# Patient Record
Sex: FEMALE | Race: WHITE | NOT HISPANIC OR LATINO
[De-identification: names, ages, dates, MRNs, and addresses within clinical notes are randomized per-mention and may not be internally consistent; named-entity substitution may affect disease eponyms.]

---

## 2019-06-14 PROBLEM — Z00.00 ENCOUNTER FOR PREVENTIVE HEALTH EXAMINATION: Status: ACTIVE | Noted: 2019-06-14

## 2019-06-26 ENCOUNTER — APPOINTMENT (OUTPATIENT)
Dept: ORTHOPEDIC SURGERY | Facility: CLINIC | Age: 84
End: 2019-06-26
Payer: MEDICARE

## 2019-06-26 VITALS — WEIGHT: 140 LBS | BODY MASS INDEX: 31.49 KG/M2 | HEIGHT: 56 IN

## 2019-06-26 DIAGNOSIS — Z82.61 FAMILY HISTORY OF ARTHRITIS: ICD-10-CM

## 2019-06-26 DIAGNOSIS — Z78.9 OTHER SPECIFIED HEALTH STATUS: ICD-10-CM

## 2019-06-26 PROCEDURE — 20610 DRAIN/INJ JOINT/BURSA W/O US: CPT | Mod: 59,RT

## 2019-06-26 PROCEDURE — 99203 OFFICE O/P NEW LOW 30 MIN: CPT | Mod: 25

## 2019-06-26 PROCEDURE — 73564 X-RAY EXAM KNEE 4 OR MORE: CPT | Mod: RT

## 2019-06-26 RX ORDER — ATENOLOL 50 MG/1
TABLET ORAL
Refills: 0 | Status: ACTIVE | COMMUNITY

## 2019-06-26 RX ORDER — PREGABALIN 300 MG/1
CAPSULE ORAL
Refills: 0 | Status: ACTIVE | COMMUNITY

## 2019-06-26 RX ORDER — ASPIRIN 325 MG/1
TABLET, FILM COATED ORAL
Refills: 0 | Status: ACTIVE | COMMUNITY

## 2019-06-26 RX ORDER — SERTRALINE 25 MG/1
TABLET, FILM COATED ORAL
Refills: 0 | Status: ACTIVE | COMMUNITY

## 2019-10-02 ENCOUNTER — APPOINTMENT (OUTPATIENT)
Dept: ORTHOPEDIC SURGERY | Facility: CLINIC | Age: 84
End: 2019-10-02
Payer: MEDICARE

## 2019-10-02 DIAGNOSIS — M25.561 PAIN IN RIGHT KNEE: ICD-10-CM

## 2019-10-02 DIAGNOSIS — M25.562 PAIN IN RIGHT KNEE: ICD-10-CM

## 2019-10-02 PROCEDURE — 99213 OFFICE O/P EST LOW 20 MIN: CPT

## 2019-10-02 NOTE — DISCUSSION/SUMMARY
[de-identified] : Discussed at length the nature of the patients condition. Their bilateral knee symptoms appear secondary to degenerative arthritis. We reviewed operative and nonoperative treatment. She is not a surgical candidate due to her age, nor does she want to consider surgery. Therefore, we will continue nonoperatively. We will seek authorization for bilateral knee Euflexxa injections. Once we receive authorization, we will proceed accordingly. In the interim, patient was given a bilateral knee cortisone injections today as detailed above for symptomatic relief. I also suggested PT and continuation of Tylenol 1000 BID. They can continue activities as tolerated.\par

## 2019-10-02 NOTE — ADDENDUM
[FreeTextEntry1] : This note was written by Daniella Lacey on 10/02/2019 acting as scribe for Dr. Phillip Gonzalez M.D.\par \par I, Dr. Phillip Gonzalez M.D., have read and attest that all the information, medical decision making and discharge instructions within are true and accurate.

## 2019-10-02 NOTE — PHYSICAL EXAM
[de-identified] : Left knee xrays, standing AP/Lateral, Merchant, and 45 degree PA standing view, taken at the office today shows diffuse tricompartmental degenerative arthritis, medial joint space narrowing, sclerosis, bone on bone, marginal osteophytes, patella sits at an appropriate height in a central position with joint space narrowing and osteophyte formation, Kellgren and Nahid grade 4\par \par Right knee xrays, standing AP/Lateral, Merchant, and 45 degree PA standing view, taken at the office today shows diffuse tricompartmental degenerative arthritis, lateral joint space narrowing, sclerosis, bone on bone, marginal osteophytes, patella sits at an appropriate height in a central position with joint space narrowing and osteophyte formation, Kellgren and Nahid grade 4\par   [de-identified] : General appearance: well nourished and hydrated, pleasant, alert and oriented x 3, cooperative.\par HEENT: Normocephalic, EOM intact, Nasal septum midline, Oral cavity clear, External auditory canal clear.\par Cardiovascular: no apparent abnormalities, bilateral lower leg edema, no varicosities, pedal pulses are palpable.\par Lymphatics Lymph nodes: none palpated, Lymphedema: not present.\par Neurologic: decreased sensation to her left lower leg to foot and ankle, otherwise sensation is normal, no muscle weakness in upper or lower extremities, patella tendon reflexes intact .\par Dermatologic no apparent skin lesions, moist, warm, no rash.\par Spine: cervical spine appears normal and moves freely, thoracic spine appears normal and moves freely, limited mobility at lumbosacral spine with healed surgical incision \par Gait: antalgic, left lateral thrust, right medial thrust \par \par Left knee\par Inspection: trace effusion \par Wounds: none.\par Alignment: trace varus alignment \par Palpation: no specific tenderness on palpation.\par ROM active (in degrees): 0-115 with crepitus and some discomfort \par Ligamentous laxity: all ligaments appear stable,, negative ant. drawer test, negative post. drawer test, stable to varus stress test, stable to valgus stress test. negative Lachman's test, negative pivot shift test\par Meniscal Test: negative McMurrays, negative Gregor.\par Patellofemoral Alignment Test: Q angle-, normal.\par Muscle Test: good quad strength.\par Leg examination: calf is soft and non-tender.\par \par Right knee\par Inspection: trace effusion \par Wounds: none.\par Alignment: normal.\par Palpation: no specific tenderness on palpation.\par ROM active (in degrees): 0-90 with crepitus and pain \par Ligamentous laxity: all ligaments appear stable,, negative ant. drawer test, negative post. drawer test, stable to varus stress test, stable to valgus stress test. negative Lachman's test, negative pivot shift test\par Meniscal Test: negative McMurrays, negative Gregor.\par Patellofemoral Alignment Test: Q angle-, normal.\par Muscle Test: good quad strength.\par Leg examination: calf is soft and non-tender.\par \par Left hip\par Inspection: No swelling or ecchymosis.\par Wounds: none.\par Palpation: non-tender.\par Stability: no instability.\par Strength: 5/5 all motor groups.\par ROM: no pain with FROM.\par Leg length: equal.\par \par Right hip\par Inspection: No swelling or ecchymosis.\par Wounds: none.\par Palpation: non-tender.\par Stability: no instability.\par Strength: 5/5 all motor groups.\par ROM: no pain with FROM.\par Leg length: equal.\par \par Left ankle\par Inspection: no erythema noted, no swelling noted.\par Palpation: no pain on palpation .\par ROM: FROM without crepitus.\par Muscle strength: 5/5.\par Stability: no instability noted.\par \par Right ankle\par Inspection: no erythema noted, no swelling noted.\par ROM: FROM without crepitus.\par Palpation: no pain on palpation .\par Muscle strength: 5/5.\par Stability: no instability noted.\par \par Left foot\par Inspection: color, texture and turgor are normal.\par ROM: full range of motion of all joints without pain or crepitus.\par Palpation: no tenderness.\par Stability: no instability noted.\par \par Right foot\par Inspection: color, texture and turgor are normal.\par ROM: full range of motion of all joints without pain or crepitus.\par Palpation: no tenderness.\par Stability: none noted.

## 2019-10-02 NOTE — HISTORY OF PRESENT ILLNESS
[de-identified] : 89 year old female presents for follow up evaluation of bilateral knee pain, L>R. She reports the cortisone injections at her last visit provide mild relief. She has been taking Tylenol and going to PT with mild relief. Patient would like to proceed with viscosupplementation at this time and is awaiting authorization.

## 2019-10-02 NOTE — HISTORY OF PRESENT ILLNESS
[de-identified] : 89 year old female presents for initial evaluation of bilateral knee pain, L>R, for many years but worse over the past 2 months. Patient denies any specific injury. She locates her pain in the medial aspect of the joint as well as posterior. Her pain is sharp intermittently with certain movements. She endorses clicking and loss of motion but denies any other mechanical symptoms.  She can walk 1 block with a cane and takes the stairs one at a time using the handrail. Patient has difficulty walking without use of a cane.  She uses Tylenol 1000 mg for pain. She was under care of Dr. Xavi Munoz who provided viscosupplementation x2, last in Dec 2018, as well as 3-4 cortisone injections several years ago. She has not tried PT. Today, she would like to discuss her treatment options with Dr. Gonzalez.

## 2019-10-02 NOTE — ADDENDUM
[FreeTextEntry1] : This note was written by Daniella Lacey on 06/26/2019 acting as scribe for Dr. Phillip Gonzalez M.D.\par \par I, Dr. Phillip Gonzalez M.D., have read and attest that all the information, medical decision making and discharge instructions within are true and accurate.\par

## 2019-10-02 NOTE — PROCEDURE
[de-identified] : BILATERAL KNEE CORTISONE INJECTION\par Discussed at length with the patient the planned steroid and lidocaine injection. The risks, benefits, convalescence and alternatives were reviewed. The possible side effects discussed included but were not limited to: pain, swelling, heat and redness. There symptoms are generally mild but if they are extensive then contact the office. Giving pain relievers by mouth such as NSAID’s or Tylenol can generally treat the reactions to  steroid and lidocaine. Rare cases of infection have been noted. Rash, hives and itching may occur post injection. If you have muscle pain or cramps, flushing and or swelling of the face, rapid heart beat, nausea, dizziness, fever, chills, headache, difficulty breathing, swelling in the arms or legs, or have a prickly feeling of your skin, contact a health care provider immediately.\par  \par Following this discussion, the knee was prepped with betadine and under sterile conditions 9 cc of 1% lidocaine and 1 cc (40 mg) of Depo-Medrol were injected with a 21 gauge needle. The needle was introduced into the joint, aspiration was performed to ensure intra-articular placement and the medication was injected. Upon withdrawal of the needle the site was cleaned with alcohol and a bandaid applied. The patient tolerated the injection well and there were no adverse effects. Post injection instructions included no strenuous activity for 24 hours, cryotherapy and if there are any adverse effects to contact the office.

## 2019-10-02 NOTE — DISCUSSION/SUMMARY
[de-identified] : Discussed at length the nature of the patients condition. Their bilateral knee symptoms appear secondary to degenerative arthritis. We reviewed operative and nonoperative treatment. She is not a surgical candidate due to her age, nor does she want to consider surgery. Therefore, we will continue nonoperatively. We will seek authorization for bilateral knee Euflexxa injections. Once we receive authorization, we will proceed accordingly. She decline cortisone injections today as they did not provide relief in the past. They can continue activities as tolerated and Tylenol prn.\par

## 2019-10-02 NOTE — PHYSICAL EXAM
[de-identified] : Left knee\par Inspection: trace effusion \par Wounds: none.\par Alignment: trace varus alignment \par Palpation: no specific tenderness on palpation.\par ROM active (in degrees): 0-115 with crepitus and some discomfort \par Ligamentous laxity: all ligaments appear stable,, negative ant. drawer test, negative post. drawer test, stable to varus stress test, stable to valgus stress test. negative Lachman's test, negative pivot shift test\par Meniscal Test: negative McMurrays, negative Gregor.\par Patellofemoral Alignment Test: Q angle-, normal.\par Muscle Test: good quad strength.\par Leg examination: calf is soft and non-tender.\par \par Right knee\par Inspection: trace effusion \par Wounds: none.\par Alignment: normal.\par Palpation: no specific tenderness on palpation.\par ROM active (in degrees): 0-90 with crepitus and pain \par Ligamentous laxity: all ligaments appear stable,, negative ant. drawer test, negative post. drawer test, stable to varus stress test, stable to valgus stress test. negative Lachman's test, negative pivot shift test\par Meniscal Test: negative McMurrays, negative Gregor.\par Patellofemoral Alignment Test: Q angle-, normal.\par Muscle Test: good quad strength.\par Leg examination: calf is soft and non-tender. [de-identified] : Left knee xrays, standing AP/Lateral, Merchant, and 45 degree PA standing view, taken at the office today shows diffuse tricompartmental degenerative arthritis, medial joint space narrowing, sclerosis, bone on bone, marginal osteophytes, patella sits at an appropriate height in a central position with joint space narrowing and osteophyte formation, Kellgren and Nahid grade 4\par \par Right knee xrays, standing AP/Lateral, Merchant, and 45 degree PA standing view, taken at the office today shows diffuse tricompartmental degenerative arthritis, lateral joint space narrowing, sclerosis, bone on bone, marginal osteophytes, patella sits at an appropriate height in a central position with joint space narrowing and osteophyte formation, Kellgren and Nahid grade 4\par

## 2019-12-04 ENCOUNTER — APPOINTMENT (OUTPATIENT)
Dept: ORTHOPEDIC SURGERY | Facility: CLINIC | Age: 84
End: 2019-12-04
Payer: MEDICARE

## 2019-12-04 VITALS — HEIGHT: 56 IN | WEIGHT: 140 LBS | BODY MASS INDEX: 31.49 KG/M2

## 2019-12-04 PROCEDURE — 20610 DRAIN/INJ JOINT/BURSA W/O US: CPT | Mod: 50

## 2019-12-04 NOTE — PROCEDURE

## 2019-12-11 ENCOUNTER — APPOINTMENT (OUTPATIENT)
Dept: ORTHOPEDIC SURGERY | Facility: CLINIC | Age: 84
End: 2019-12-11
Payer: MEDICARE

## 2019-12-11 VITALS — BODY MASS INDEX: 31.49 KG/M2 | HEIGHT: 56 IN | WEIGHT: 140 LBS

## 2019-12-11 DIAGNOSIS — M17.0 BILATERAL PRIMARY OSTEOARTHRITIS OF KNEE: ICD-10-CM

## 2019-12-11 PROCEDURE — 20610 DRAIN/INJ JOINT/BURSA W/O US: CPT | Mod: RT

## 2019-12-11 NOTE — PROCEDURE

## 2019-12-13 ENCOUNTER — APPOINTMENT (OUTPATIENT)
Dept: ORTHOPEDIC SURGERY | Facility: CLINIC | Age: 84
End: 2019-12-13
Payer: MEDICARE

## 2019-12-13 DIAGNOSIS — M70.21 OLECRANON BURSITIS, RIGHT ELBOW: ICD-10-CM

## 2019-12-13 DIAGNOSIS — M12.811 OTHER SPECIFIC ARTHROPATHIES, NOT ELSEWHERE CLASSIFIED, RIGHT SHOULDER: ICD-10-CM

## 2019-12-13 DIAGNOSIS — S49.91XA UNSPECIFIED INJURY OF RIGHT SHOULDER AND UPPER ARM, INITIAL ENCOUNTER: ICD-10-CM

## 2019-12-13 PROCEDURE — 73030 X-RAY EXAM OF SHOULDER: CPT | Mod: RT

## 2019-12-13 PROCEDURE — 99214 OFFICE O/P EST MOD 30 MIN: CPT

## 2019-12-13 PROCEDURE — 73070 X-RAY EXAM OF ELBOW: CPT | Mod: RT

## 2019-12-13 NOTE — HISTORY OF PRESENT ILLNESS
[de-identified] : 89 yo F presents c/o R shoulder/elbow pain for 2 months.  She sustained a mechanical fall onto her right side.  She struck her elbow at that time.  She started to develop shoulder pain shortly after.  At that time she went to [place] [facility] where her head and ankle evaluated.  SHe has pain in her right shoulder an elbow.  Shoulder pain worse with lifting above head, needs to use assistance with other arm.  Elbow pain worse with leaning on it.  She states about 15 yrs ago she injured her right shoulder, surgery recommended at that time but did not proceed with it.  \par \par The patient's past medical history, past surgical history, medications, allergies, and social history were reviewed by me today with the patient and documented accordingly. In addition, the patient's family history, which is noncontributory to this visit, was also reviewed.\par

## 2019-12-13 NOTE — PHYSICAL EXAM
[de-identified] : General Exam\par \par Well developed, well nourished\par No apparent distress\par Oriented to person, place, and time\par Mood: Normal\par Affect: Normal\par Balance and coordination: Normal\par Gait: Normal\par \par Right shoulder exam\par \par Inspection: No swelling, ecchymosis or gross deformity.\par Skin: No masses, No lesions\par Neck: Negative Spurlings, full ROM without pain\par Tenderness: No bicipital tenderness, no tenderness to the greater tuberosity/RTC insertion, no anterior shoulder/lesser tuberosity tenderness. No tenderness SC joint, clavicle, AC joint.\par ROM: 150/60/T6\par Impingement tests: Positive Viveros\par AC Joint: no pain with cross arm testing\par Biceps: Negative speed\par Strength: 4/5 abduction, external rotation, and internal rotation\par Neuro: AIN, PIN, Ulnar nerve motor intact\par Sensation: Intact to light touch in radial, median, ulnar, and axillary nerve distributions\par Vasc: 2+ radial pulse\par \par right elbow exam:\par \par Skin: Clean, dry, intact. No ecchymosis. mild swelling olecranon. No palpable joint effusion. No gross deformity.\par Tenderness: + tenderness to palpation lateral epicondyle, No medial epicondyle, olecranon, radial head. \par ROM:0-140° full pronation full supination\par Stability: Stable to vaus/valgus stress\par Neuro: Negative tinels at ulnar canal, AIN/PIN/Ulnar nerve in tact to motor/sensation.\par Strength: 5/5 elbow flexion, 5/5 elbow extension, 5/5 supination, 5/5 pronation\par Sensation: In tact to light touch throughout\par Vasc: 2+ radial pulse, <2s cap refill [de-identified] : \par The following radiographs were ordered and read by me during this patients visit. I reviewed each radiograph in detail with the patient and discussed the findings as highlighted below. \par \par 2 views right elbow 2 views right shoulder obtained today. There is soft tissue swelling in the posterior elbow consistent with olecranon bursitis. In the right shoulder with superior migration of the humeral head consistent with a rotator cuff arthropathy\par \par

## 2019-12-18 ENCOUNTER — APPOINTMENT (OUTPATIENT)
Dept: ORTHOPEDIC SURGERY | Facility: CLINIC | Age: 84
End: 2019-12-18
Payer: MEDICARE

## 2019-12-18 VITALS — BODY MASS INDEX: 31.49 KG/M2 | HEIGHT: 56 IN | WEIGHT: 140 LBS

## 2019-12-18 DIAGNOSIS — M17.0 BILATERAL PRIMARY OSTEOARTHRITIS OF KNEE: ICD-10-CM

## 2019-12-18 PROCEDURE — 20610 DRAIN/INJ JOINT/BURSA W/O US: CPT | Mod: LT

## 2019-12-18 PROCEDURE — 20610 DRAIN/INJ JOINT/BURSA W/O US: CPT | Mod: RT

## 2019-12-18 NOTE — PROCEDURE

## 2022-07-01 ENCOUNTER — APPOINTMENT (OUTPATIENT)
Dept: ORTHOPEDIC SURGERY | Facility: CLINIC | Age: 87
End: 2022-07-01

## 2024-02-13 NOTE — DISCUSSION/SUMMARY
[de-identified] : Right elbow olecranon bursitis right shoulder rotator cuff arthropathy she'll start a course of physical therapy she will followup as needed. All questions answered
None